# Patient Record
Sex: FEMALE | Race: WHITE | Employment: STUDENT | URBAN - NONMETROPOLITAN AREA
[De-identification: names, ages, dates, MRNs, and addresses within clinical notes are randomized per-mention and may not be internally consistent; named-entity substitution may affect disease eponyms.]

---
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
## 2022-07-05 ENCOUNTER — OPTICAL OFFICE (OUTPATIENT)
Dept: URBAN - NONMETROPOLITAN AREA CLINIC 4 | Facility: CLINIC | Age: 22
Setting detail: OPHTHALMOLOGY
End: 2022-07-05
Payer: COMMERCIAL

## 2022-07-05 DIAGNOSIS — H52.223: ICD-10-CM

## 2022-07-05 PROCEDURE — V2020 VISION SVCS FRAMES PURCHASES: HCPCS | Performed by: OPTOMETRIST

## 2022-07-05 PROCEDURE — V2102 SINGL VISN SPHERE 7.12-20.00: HCPCS | Performed by: OPTOMETRIST

## 2022-07-05 PROCEDURE — V2784 LENS POLYCARB OR EQUAL: HCPCS | Performed by: OPTOMETRIST

## 2022-07-05 PROCEDURE — V2103 SPHEROCYLINDR 4.00D/12-2.00D: HCPCS | Performed by: OPTOMETRIST

## 2022-07-08 ENCOUNTER — OPTICAL OFFICE (OUTPATIENT)
Dept: URBAN - NONMETROPOLITAN AREA CLINIC 4 | Facility: CLINIC | Age: 22
Setting detail: OPHTHALMOLOGY
End: 2022-07-08
Payer: COMMERCIAL

## 2022-07-08 DIAGNOSIS — H52.223: ICD-10-CM

## 2022-07-08 PROCEDURE — V2103 SPHEROCYLINDR 4.00D/12-2.00D: HCPCS | Performed by: OPTOMETRIST

## 2022-07-08 PROCEDURE — V2102 SINGL VISN SPHERE 7.12-20.00: HCPCS | Performed by: OPTOMETRIST

## 2022-07-08 PROCEDURE — V2020 VISION SVCS FRAMES PURCHASES: HCPCS | Performed by: OPTOMETRIST

## 2022-07-08 PROCEDURE — V2784 LENS POLYCARB OR EQUAL: HCPCS | Performed by: OPTOMETRIST

## 2022-12-02 ENCOUNTER — OFFICE VISIT (OUTPATIENT)
Dept: URGENT CARE | Facility: CLINIC | Age: 22
End: 2022-12-02

## 2022-12-02 VITALS
RESPIRATION RATE: 16 BRPM | OXYGEN SATURATION: 97 % | DIASTOLIC BLOOD PRESSURE: 94 MMHG | HEART RATE: 112 BPM | WEIGHT: 270 LBS | BODY MASS INDEX: 39.87 KG/M2 | TEMPERATURE: 102.1 F | SYSTOLIC BLOOD PRESSURE: 132 MMHG

## 2022-12-02 DIAGNOSIS — R68.89 FLU-LIKE SYMPTOMS: Primary | ICD-10-CM

## 2022-12-02 RX ORDER — ACETAMINOPHEN 325 MG/1
975 TABLET ORAL ONCE
Status: COMPLETED | OUTPATIENT
Start: 2022-12-02 | End: 2022-12-02

## 2022-12-02 RX ADMIN — ACETAMINOPHEN 975 MG: 325 TABLET ORAL at 09:35

## 2022-12-02 NOTE — LETTER
[address] [facility]   [facility] [address]  [address] [facility] [address]  Dept: [phone]    December 2, 2022    Patient: Brittany Walters Minor  YOB: 2000    Isela LLILY Minor was seen and evaluated at our [facility]  Please note if Covid and Flu tests are negative, they may return to work when fever free for 24 hours without the use of a fever reducing agent  If Covid or Flu test is positive, they may return to work on 12/6/2022, as this is 5 days from the onset of symptoms  Upon return, they must then adhere to strict masking for an additional 5 days      Sincerely,    LARS Inman

## 2022-12-02 NOTE — PATIENT INSTRUCTIONS
COVID/Flu PCR pending  Results will be available via [employer]  Follow CDC guidelines for isolation/quarantine until results back and then as appropriate  Continue with supportive measures, OTC Tylenol/Ibuprofen, nasal decongestants/cough suppressants   Cool mist humidifiers, throat lozenges, increased fluid intake and rest   Follow up with PCP in 3-5 days  Present to ER if symptoms worsen    Viral Syndrome   AMBULATORY CARE:   Viral syndrome  is a term used for symptoms of an infection caused by a virus  Viruses are spread easily from person to person through the air and on shared items  Signs and symptoms  may start slowly or suddenly and last hours to days  They can be mild to severe and can change over days or hours  You may have any of the following:  Fever and chills    A runny or stuffy nose    Cough, sore throat, or hoarseness    Headache, or pain and pressure around your eyes    Muscle aches and joint pain    Shortness of breath or wheezing    Abdominal pain, cramps, and diarrhea    Nausea, vomiting, or loss of appetite    Call your local emergency number (911 in the [address] [place] [address],[address]) or have someone else call if:   You have a seizure  You cannot be woken  You have chest pain or trouble breathing  Seek care immediately if:   You have a stiff neck, a bad headache, and sensitivity to light  You feel weak, dizzy, or confused  You stop urinating or urinate a lot less than usual     You cough up blood or thick yellow or green mucus  You have severe abdominal pain or your abdomen is larger than usual     Call your doctor if:   Your symptoms do not get better with treatment or get worse after 3 days  You have a rash or ear pain  You have burning when you urinate  You have questions or concerns about your condition or care  Treatment for viral syndrome  may include medicines to manage your symptoms  Antibiotics are not given for a viral infection   You may  need any of the following:  Acetaminophen  decreases pain and fever  It is available without a doctor's order  Ask how much to take and how often to take it  Follow directions  Read the labels of all other medicines you are using to see if they also contain acetaminophen, or ask your doctor or pharmacist  Acetaminophen can cause liver damage if not taken correctly  Do not use more than 4 grams (4,000 milligrams) total of acetaminophen in one day  NSAIDs , such as ibuprofen, help decrease swelling, pain, and fever  NSAIDs can cause stomach bleeding or kidney problems in certain people  If you take blood thinner medicine, always ask your healthcare provider if NSAIDs are safe for you  Always read the medicine label and follow directions  Cold medicine  helps decrease swelling, control a cough, and relieve chest or nasal congestion  Saline nasal spray  helps decrease nasal congestion  Manage your symptoms:   Drink liquids as directed to prevent dehydration  Ask how much liquid to drink each day and which liquids are best for you  Ask if you should drink an oral rehydration solution (ORS)  An ORS has the right amounts of water, salts, and sugar you need to replace body fluids  This may help prevent dehydration caused by vomiting or diarrhea  Do not drink liquids with caffeine  Liquids with caffeine can make dehydration worse  Get plenty of rest to help your body heal   Take naps throughout the day  Ask your healthcare provider when you can return to work and your normal activities  Use a cool mist humidifier to help you breathe easier  Ask your healthcare provider how to use a cool mist humidifier  Eat honey or use cough drops for a sore throat  Cough drops are available without a doctor's order  Follow directions for taking cough drops  Do not smoke or be close to anyone who is smoking  Nicotine and other chemicals in cigarettes and cigars can cause lung damage  Smoking can also delay healing   Ask your healthcare provider for information if you currently smoke and need help to quit  E-cigarettes or smokeless tobacco still contain nicotine  Talk to your healthcare provider before you use these products  Prevent the spread of germs:       Wash your hands often  Wash your hands several times each day  Wash after you use the bathroom, change a child's diaper, and before you prepare or eat food  Use soap and water every time  Rub your soapy hands together, lacing your fingers  Wash the front and back of your hands, and in between your fingers  Use the fingers of one hand to scrub under the fingernails of the other hand  Wash for at least 20 seconds  Rinse with warm, running water for several seconds  Then dry your hands with a clean towel or paper towel  Use hand  that contains alcohol if soap and water are not available  Do not touch your eyes, nose, or mouth without washing your hands first          Cover a sneeze or cough  Use a tissue that covers your mouth and nose  Throw the tissue away in a trash can right away  Use the bend of your arm if a tissue is not available  Wash your hands well with soap and water or use a hand   Stay away from others while you are sick  Avoid crowds as much as possible  Ask about vaccines you may need  Talk to your healthcare provider about your vaccine history  He or she will tell you which vaccines you need, and when to get them  Get the influenza (flu) vaccine as soon as recommended each year  The flu vaccine is available starting in September or October  Flu viruses change, so it is important to get a flu vaccine every year  Get the pneumonia vaccine if recommended  This vaccine is usually recommended every 5 years  Your provider will tell you when to get this vaccine, if needed  Follow up with your doctor as directed:  Write down your questions so you remember to ask them during your visits    © Copyright [employer] 2022 Information is for End User's use only and may not be sold, redistributed or otherwise used for commercial purposes  All illustrations and images included in CareNotes® are the copyrighted property of A D [employer] M [employer]  or Johann Olivares  The above information is an  only  It is not intended as medical advice for individual conditions or treatments  Talk to your doctor, nurse or pharmacist before following any medical regimen to see if it is safe and effective for you

## 2022-12-02 NOTE — PROGRESS NOTES
[facility] [employer]        NAME: Randy Abraham is a 23 y o  female  : 2000    MRN: [id number]  DATE: 2022  TIME: 9:43 AM    Assessment and Plan   Flu-like symptoms [R68 89]  1  Flu-like symptoms  Covid19 and INFLUENZA A/B PCR    acetaminophen (TYLENOL) tablet 975 mg        While patient febrile in clinic, lungs clear on PE and so chest XR not indicated  Patient medicated with Tylenol for fever  COVID/Flu PCR pending  Results will be viewable via [employer]  Follow CDC guidelines for isolation/quarantine until results back and then as appropriate based on final results  Presumed Influenza given known sick contacts and exposures, encouraged continued supportive measures  Increased fluid intake and rest  Follow up with PCP in 3-5 days or proceed to emergency department for worsening symptoms  Patient verbalized understanding of instructions given  Patient Instructions     Patient Instructions     COVID/Flu PCR pending  Results will be available via [employer]  Follow CDC guidelines for isolation/quarantine until results back and then as appropriate  Continue with supportive measures, OTC Tylenol/Ibuprofen, nasal decongestants/cough suppressants   Cool mist humidifiers, throat lozenges, increased fluid intake and rest   Follow up with PCP in 3-5 days  Present to ER if symptoms worsen    Viral Syndrome   AMBULATORY CARE:   Viral syndrome  is a term used for symptoms of an infection caused by a virus  Viruses are spread easily from person to person through the air and on shared items  Signs and symptoms  may start slowly or suddenly and last hours to days  They can be mild to severe and can change over days or hours   You may have any of the following:  · Fever and chills    · A runny or stuffy nose    · Cough, sore throat, or hoarseness    · Headache, or pain and pressure around your eyes    · Muscle aches and joint pain    · Shortness of breath or wheezing    · Abdominal pain, cramps, and diarrhea    · Nausea, vomiting, or loss of appetite    Call your local emergency number (911 in the [facility]) or have someone else call if:   · You have a seizure  · You cannot be woken  · You have chest pain or trouble breathing  Seek care immediately if:   · You have a stiff neck, a bad headache, and sensitivity to light  · You feel weak, dizzy, or confused  · You stop urinating or urinate a lot less than usual     · You cough up blood or thick yellow or green mucus  · You have severe abdominal pain or your abdomen is larger than usual     Call your doctor if:   · Your symptoms do not get better with treatment or get worse after 3 days  · You have a rash or ear pain  · You have burning when you urinate  · You have questions or concerns about your condition or care  Treatment for viral syndrome  may include medicines to manage your symptoms  Antibiotics are not given for a viral infection  You may  need any of the following:  · Acetaminophen  decreases pain and fever  It is available without a doctor's order  Ask how much to take and how often to take it  Follow directions  Read the labels of all other medicines you are using to see if they also contain acetaminophen, or ask your doctor or pharmacist  Acetaminophen can cause liver damage if not taken correctly  Do not use more than 4 grams (4,000 milligrams) total of acetaminophen in one day  · NSAIDs , such as ibuprofen, help decrease swelling, pain, and fever  NSAIDs can cause stomach bleeding or kidney problems in certain people  If you take blood thinner medicine, always ask your healthcare provider if NSAIDs are safe for you  Always read the medicine label and follow directions  · Cold medicine  helps decrease swelling, control a cough, and relieve chest or nasal congestion  · Saline nasal spray  helps decrease nasal congestion  Manage your symptoms:   · Drink liquids as directed to prevent dehydration    Ask how much liquid to drink each day and which liquids are best for you  Ask if you should drink an oral rehydration solution (ORS)  An ORS has the right amounts of water, salts, and sugar you need to replace body fluids  This may help prevent dehydration caused by vomiting or diarrhea  Do not drink liquids with caffeine  Liquids with caffeine can make dehydration worse  · Get plenty of rest to help your body heal   Take naps throughout the day  Ask your healthcare provider when you can return to work and your normal activities  · Use a cool mist humidifier to help you breathe easier  Ask your healthcare provider how to use a cool mist humidifier  · Eat honey or use cough drops for a sore throat  Cough drops are available without a doctor's order  Follow directions for taking cough drops  · Do not smoke or be close to anyone who is smoking  Nicotine and other chemicals in cigarettes and cigars can cause lung damage  Smoking can also delay healing  Ask your healthcare provider for information if you currently smoke and need help to quit  E-cigarettes or smokeless tobacco still contain nicotine  Talk to your healthcare provider before you use these products  Prevent the spread of germs:       1  Wash your hands often  Wash your hands several times each day  Wash after you use the bathroom, change a child's diaper, and before you prepare or eat food  Use soap and water every time  Rub your soapy hands together, lacing your fingers  Wash the front and back of your hands, and in between your fingers  Use the fingers of one hand to scrub under the fingernails of the other hand  Wash for at least 20 seconds  Rinse with warm, running water for several seconds  Then dry your hands with a clean towel or paper towel  Use hand  that contains alcohol if soap and water are not available  Do not touch your eyes, nose, or mouth without washing your hands first          2  Cover a sneeze or cough    Use a tissue that covers your mouth and nose  Throw the tissue away in a trash can right away  Use the bend of your arm if a tissue is not available  Wash your hands well with soap and water or use a hand   3  Stay away from others while you are sick  Avoid crowds as much as possible  4  Ask about vaccines you may need  Talk to your healthcare provider about your vaccine history  He or she will tell you which vaccines you need, and when to get them  ? Get the influenza (flu) vaccine as soon as recommended each year  The flu vaccine is available starting in September or October  Flu viruses change, so it is important to get a flu vaccine every year  ? Get the pneumonia vaccine if recommended  This vaccine is usually recommended every 5 years  Your provider will tell you when to get this vaccine, if needed  Follow up with your doctor as directed:  Write down your questions so you remember to ask them during your visits  © [employer] 2022 Information is for End User's use only and may not be sold, redistributed or otherwise used for commercial purposes  All illustrations and images included in CareNotes® are the copyrighted property of [employer]  or [address] [place], [place] [address]carlos   The above information is an  only  It is not intended as medical advice for individual conditions or treatments  Talk to your doctor, nurse or pharmacist before following any medical regimen to see if it is safe and effective for you  Chief Complaint     Chief Complaint   Patient presents with   • Cough     Cough, congestion, sweats and fatigue  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         History of Present Illness       17-year-old female presents with complaints of cough, sore throat, body aches, chills, and diarrhea x2 days  Patient states positive sick contacts as 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    She has not been taking any OTC medications for her symptoms  She is a current smoker, admits to vaping  She did not receive her influenza vaccination yet this year  Review of Systems   Review of Systems   Constitutional: Positive for chills and fever  HENT: Positive for congestion and sore throat  Negative for ear discharge, ear pain, trouble swallowing and voice change  Eyes: Negative for discharge  Respiratory: Positive for cough  Negative for shortness of breath and wheezing  Cardiovascular: Negative for chest pain  Gastrointestinal: Positive for diarrhea  Negative for abdominal pain, nausea and vomiting  Musculoskeletal: Positive for myalgias  Skin: Negative for rash  Current Medications       Current Outpatient Medications:   •  FLUoxetine (PROzac) 10 mg capsule, Take 10 mg by mouth daily, Disp: , Rfl:   •  albuterol (Proventil HFA) 90 mcg/act inhaler, Inhale 2 puffs every 6 (six) hours as needed for wheezing, Disp: 6 7 g, Rfl: 0  •  raltegravir (ISENTRESS) 400 mg tablet, Take 400 mg by mouth 2 (two) times a day, Disp: , Rfl:   No current facility-administered medications for this visit  Current Allergies     Allergies as of 12/02/2022 - Reviewed 12/02/2022   Allergen Reaction Noted   • Amoxicillin Rash 11/26/2019            The following portions of the patient's history were reviewed and updated as appropriate: allergies, current medications, past family history, past medical history, past social history, past surgical history and problem list      Past Medical History:   Diagnosis Date   • Anxiety    • Depression        Past Surgical History:   Procedure Laterality Date   • NO PAST SURGERIES         Family History   Problem Relation Age of Onset   • No Known Problems Mother    • No Known Problems Father          Medications have been verified          Objective   /94   Pulse (!) 112   Temp (!) 102 1 °F (38 9 °C)   Resp 16   Wt 122 kg (270 lb)   LMP 11/06/2022   SpO2 97%   BMI 39 87 kg/m²   Patient's last menstrual period was 11/06/2022  Physical Exam     Physical Exam  Vitals and nursing note reviewed  Constitutional:       General: She is not in acute distress  Appearance: She is ill-appearing  She is not toxic-appearing  HENT:      Head: Normocephalic  Right Ear: Tympanic membrane, ear canal and external ear normal       Left Ear: Tympanic membrane, ear canal and external ear normal       Nose: Congestion present  Mouth/Throat:      Mouth: Mucous membranes are moist       Pharynx: Posterior oropharyngeal erythema present  Tonsils: No tonsillar exudate  Eyes:      Conjunctiva/sclera: Conjunctivae normal    Cardiovascular:      Rate and Rhythm: Regular rhythm  Tachycardia present  Heart sounds: Normal heart sounds  Pulmonary:      Effort: Pulmonary effort is normal  No respiratory distress  Breath sounds: Normal breath sounds  No stridor  No wheezing, rhonchi or rales  Lymphadenopathy:      Cervical: No cervical adenopathy  Skin:     General: Skin is warm and dry  Neurological:      Mental Status: She is alert and oriented to person, place, and time  Gait: Gait is intact     Psychiatric:         Mood and Affect: Mood normal          Behavior: Behavior normal

## 2022-12-03 LAB
FLUAV RNA RESP QL NAA+PROBE: NEGATIVE
FLUBV RNA RESP QL NAA+PROBE: NEGATIVE
SARS-COV-2 RNA RESP QL NAA+PROBE: NEGATIVE

## 2022-12-04 ENCOUNTER — HOSPITAL ENCOUNTER (EMERGENCY)
Facility: HOSPITAL | Age: 22
Discharge: HOME | End: 2022-12-04
Attending: EMERGENCY MEDICINE

## 2022-12-04 ENCOUNTER — APPOINTMENT (EMERGENCY)
Dept: RADIOLOGY | Facility: HOSPITAL | Age: 22
End: 2022-12-04

## 2022-12-04 VITALS
TEMPERATURE: 97.3 F | HEART RATE: 98 BPM | DIASTOLIC BLOOD PRESSURE: 80 MMHG | RESPIRATION RATE: 20 BRPM | SYSTOLIC BLOOD PRESSURE: 143 MMHG | OXYGEN SATURATION: 98 %

## 2022-12-04 DIAGNOSIS — J32.9 SINUSITIS: ICD-10-CM

## 2022-12-04 DIAGNOSIS — J06.9 URI (UPPER RESPIRATORY INFECTION): Primary | ICD-10-CM

## 2022-12-04 LAB
BACTERIA UR QL AUTO: ABNORMAL /HPF
BILIRUB UR QL STRIP: ABNORMAL
CLARITY UR: CLEAR
COLOR UR: YELLOW
GLUCOSE UR STRIP-MCNC: NEGATIVE MG/DL
HGB UR QL STRIP.AUTO: NEGATIVE
KETONES UR STRIP-MCNC: NEGATIVE MG/DL
LEUKOCYTE ESTERASE UR QL STRIP: ABNORMAL
MUCOUS THREADS UR QL AUTO: ABNORMAL
NITRITE UR QL STRIP: NEGATIVE
NON-SQ EPI CELLS URNS QL MICRO: ABNORMAL /HPF
PH UR STRIP.AUTO: 6 [PH]
PROT UR STRIP-MCNC: ABNORMAL MG/DL
RBC #/AREA URNS AUTO: ABNORMAL /HPF
S PYO DNA THROAT QL NAA+PROBE: NOT DETECTED
SP GR UR STRIP.AUTO: >=1.03 (ref 1–1.03)
UROBILINOGEN UR QL STRIP.AUTO: 0.2 E.U./DL
WBC #/AREA URNS AUTO: ABNORMAL /HPF

## 2022-12-04 RX ORDER — FLUCONAZOLE 150 MG/1
150 TABLET ORAL ONCE
Qty: 1 TABLET | Refills: 1 | Status: SHIPPED | OUTPATIENT
Start: 2022-12-04 | End: 2022-12-04

## 2022-12-04 RX ORDER — DOXYCYCLINE HYCLATE 100 MG/1
100 CAPSULE ORAL ONCE
Status: COMPLETED | OUTPATIENT
Start: 2022-12-04 | End: 2022-12-04

## 2022-12-04 RX ORDER — KETOROLAC TROMETHAMINE 30 MG/ML
15 INJECTION, SOLUTION INTRAMUSCULAR; INTRAVENOUS ONCE
Status: COMPLETED | OUTPATIENT
Start: 2022-12-04 | End: 2022-12-04

## 2022-12-04 RX ORDER — DOXYCYCLINE HYCLATE 100 MG/1
100 CAPSULE ORAL 2 TIMES DAILY
Qty: 10 CAPSULE | Refills: 0 | Status: SHIPPED | OUTPATIENT
Start: 2022-12-04 | End: 2022-12-09

## 2022-12-04 RX ADMIN — DOXYCYCLINE 100 MG: 100 CAPSULE ORAL at 18:04

## 2022-12-04 RX ADMIN — KETOROLAC TROMETHAMINE 15 MG: 30 INJECTION, SOLUTION INTRAMUSCULAR at 16:21

## 2022-12-04 NOTE — ED PROVIDER NOTES
History  Chief Complaint   Patient presents with   • Fever - 9 weeks to 74 years     Patient c/o fever, body aches, chills, bilateral ear pain and cough since Thursday  Patient took nothing for pain or fever since last night  Patient is a 77-year-old female presents emergency room with her mother with chief complaint the patient having fever, body aches, chills, bilateral ear pain, cough, congestion ongoing since last Thursday  Was seen at the urgent care 2 days ago and had COVID influenza testing  Both of which were negative  Patient has persisted with her symptomatology and presents emergency room for further evaluation  Patient has an additional symptomatology of dysuria but denies any frequency or urgency  Has no abdominal pain  Had 1 episode of diarrhea that is now relieved  Has had no nausea vomiting has tolerated p o  Intake without any difficulty  She is not currently on any medications  She denies any chronic medical illnesses  History provided by:  Patient and parent  Flu Symptoms  Presenting symptoms: cough, diarrhea, fatigue, fever, headache, myalgias and sore throat    Presenting symptoms: no nausea, no rhinorrhea, no shortness of breath and no vomiting    Diarrhea:     Progression:  Resolved  Fever:     Max temp PTA:  100    Temp source:  Oral  Headaches:     Severity:  Moderate  Myalgias:     Location:  Generalized  Associated symptoms: decreased appetite, decreased physical activity and nasal congestion    Associated symptoms: no ear pain    Risk factors: no diabetes problem, no heart disease, no immunocompromised state, no kidney disease, no liver disease and not pregnant        Prior to Admission Medications   Prescriptions Last Dose Informant Patient Reported? Taking?    FLUoxetine (PROzac) 10 mg capsule   Yes No   Sig: Take 10 mg by mouth daily   albuterol (Proventil HFA) 90 mcg/act inhaler   No No   Sig: Inhale 2 puffs every 6 (six) hours as needed for wheezing   raltegravir (ISENTRESS) 400 mg tablet   Yes No   Sig: Take 400 mg by mouth 2 (two) times a day      Facility-Administered Medications: None       Past Medical History:   Diagnosis Date   • Anxiety    • Depression        Past Surgical History:   Procedure Laterality Date   • NO PAST SURGERIES         Family History   Problem Relation Age of Onset   • No Known Problems Mother    • No Known Problems Father      I have reviewed and agree with the history as documented  E-Cigarette/Vaping   • E-Cigarette Use Current Every Day User    • Comments "I hit it basically all day everyday"      E-Cigarette/Vaping Substances     Social History     Tobacco Use   • Smoking status: Every Day     Types: E-Cigarettes     Passive exposure: Never   • Smokeless tobacco: Never   Vaping Use   • Vaping Use: Every day   Substance Use Topics   • Alcohol use: Not Currently   • Drug use: Never       Review of Systems   Constitutional: Positive for decreased appetite, fatigue and fever  Negative for activity change  HENT: Positive for congestion and sore throat  Negative for ear pain, rhinorrhea and sinus pressure  Eyes: Negative  Respiratory: Positive for cough  Negative for chest tightness, shortness of breath, wheezing and stridor  Cardiovascular: Negative  Negative for chest pain, palpitations and leg swelling  Gastrointestinal: Positive for diarrhea  Negative for abdominal pain, nausea and vomiting  Endocrine: Negative  Genitourinary: Positive for dysuria  Negative for flank pain, frequency, urgency and vaginal bleeding  Musculoskeletal: Positive for myalgias  Negative for arthralgias and back pain  Skin: Negative  Negative for rash  Allergic/Immunologic: Negative  Neurological: Positive for headaches  Negative for dizziness, weakness and light-headedness  Hematological: Negative  Psychiatric/Behavioral: Negative  All other systems reviewed and are negative        Physical Exam  Physical Exam  Vitals and nursing note reviewed  Constitutional:       General: She is awake  She is in acute distress  Appearance: Normal appearance  She is well-developed  She is obese  She is not ill-appearing or toxic-appearing  HENT:      Head: Normocephalic and atraumatic  Right Ear: Tympanic membrane, ear canal and external ear normal       Left Ear: Tympanic membrane, ear canal and external ear normal       Nose: Nose normal  No congestion  Mouth/Throat:      Mouth: Mucous membranes are moist       Pharynx: Posterior oropharyngeal erythema present  No oropharyngeal exudate  Eyes:      General: Lids are normal  No scleral icterus  Extraocular Movements: Extraocular movements intact  Pupils: Pupils are equal, round, and reactive to light  Cardiovascular:      Rate and Rhythm: Normal rate and regular rhythm  Heart sounds: Normal heart sounds  No murmur heard  Pulmonary:      Effort: Pulmonary effort is normal  No respiratory distress  Breath sounds: Normal breath sounds  No wheezing, rhonchi or rales  Abdominal:      General: Abdomen is flat  There is no distension  Palpations: Abdomen is soft  Tenderness: There is no abdominal tenderness  There is no guarding or rebound  Musculoskeletal:         General: No swelling, tenderness or deformity  Normal range of motion  Cervical back: Normal range of motion and neck supple  Skin:     General: Skin is warm and dry  Capillary Refill: Capillary refill takes less than 2 seconds  Coloration: Skin is not jaundiced or pale  Findings: No bruising or rash  Neurological:      General: No focal deficit present  Mental Status: She is alert and oriented to person, place, and time  Mental status is at baseline  Cranial Nerves: No cranial nerve deficit  Motor: No weakness     Psychiatric:         Attention and Perception: Attention normal          Mood and Affect: Mood normal          Speech: Speech normal  Behavior: Behavior normal          Thought Content:  Thought content normal          Judgment: Judgment normal          Vital Signs  ED Triage Vitals   Temperature Pulse Respirations Blood Pressure SpO2   12/04/22 1535 12/04/22 1535 12/04/22 1535 12/04/22 1535 12/04/22 1535   (!) 97 3 °F (36 3 °C) 98 20 143/80 98 %      Temp Source Heart Rate Source Patient Position - Orthostatic VS BP Location FiO2 (%)   12/04/22 1535 12/04/22 1535 12/04/22 1535 12/04/22 1535 --   Temporal Monitor Sitting Left arm       Pain Score       12/04/22 1621       5           Vitals:    12/04/22 1535   BP: 143/80   Pulse: 98   Patient Position - Orthostatic VS: Sitting         Visual Acuity      ED Medications  Medications   doxycycline hyclate (VIBRAMYCIN) capsule 100 mg (has no administration in time range)   ketorolac (TORADOL) injection 15 mg (15 mg Intramuscular Given 12/4/22 1621)       Diagnostic Studies  Results Reviewed     Procedure Component Value Units Date/Time    Urine Microscopic [[id number]]  (Abnormal) Collected: 12/04/22 1626    Lab Status: Final result Specimen: Urine, Clean Catch Updated: 12/04/22 1744     RBC, UA 2-4 /hpf      WBC, UA 2-4 /hpf      Epithelial Cells Moderate /hpf      Bacteria, UA Moderate /hpf      MUCUS THREADS Moderate    Strep A PCR [[id number]]  (Normal) Collected: 12/04/22 1626    Lab Status: Final result Specimen: Throat Updated: 12/04/22 1700     STREP A PCR Not Detected    UA (URINE) with reflex to Scope [[id number]]  (Abnormal) Collected: 12/04/22 1626    Lab Status: Final result Specimen: Urine, Clean Catch Updated: 12/04/22 1645     Color, UA Yellow     Clarity, UA Clear     Specific Gravity, UA >=1 030     pH, UA 6 0     Leukocytes, UA Trace     Nitrite, UA Negative     Protein, UA Trace mg/dl      Glucose, UA Negative mg/dl      Ketones, UA Negative mg/dl      Urobilinogen, UA 0 2 E U /dl      Bilirubin, UA Small     Occult Blood, UA Negative                 XR chest 2 views   ED Interpretation by Kalia Taylor DO (12/04 1623)   Chest x-ray negative                 Procedures  Procedures         ED Course  ED Course as of 12/04/22 1750   Sun Dec 04, 2022   1525 Flu and Covid are negative from 12/2/2022   1649 Leukocytes, UA(!): Trace   1709 STREP A PCR: Not Detected   1744 Urine likely contaminated  Mother concerned about worsening sinuitis  MDM  Number of Diagnoses or Management Options  Sinusitis: new and requires workup  URI (upper respiratory infection): new and requires workup     Amount and/or Complexity of Data Reviewed  Clinical lab tests: ordered and reviewed  Tests in the radiology section of CPT®: ordered and reviewed  Independent visualization of images, tracings, or specimens: yes    Risk of Complications, Morbidity, and/or Mortality  Presenting problems: moderate  Diagnostic procedures: moderate  Management options: moderate    Patient Progress  Patient progress: stable      Disposition  Final diagnoses:   URI (upper respiratory infection)   Sinusitis     Time reflects when diagnosis was documented in both MDM as applicable and the Disposition within this note     Time User Action Codes Description Comment    12/4/2022  5:46 PM Brenda Lane Add [J06 9] URI (upper respiratory infection)     12/4/2022  5:46 PM Brenda Lane Add [J32 9] Sinusitis       ED Disposition     ED Disposition   Discharge    Condition   Stable    Date/Time   Sun Dec 4, 2022  5:46 PM    Comment   Annmarie LILLY Minor discharge to home/self care                 Follow-up Information     Follow up With Specialties Details Why Contact Info    Juli Mendoza MD Pediatrics   [address]  [phone]            Patient's Medications   Discharge Prescriptions    DOXYCYCLINE HYCLATE (VIBRAMYCIN) 100 MG CAPSULE    Take 1 capsule (100 mg total) by mouth 2 (two) times a day for 5 days       Start Date: 12/4/2022 End Date: 12/9/2022 Order Dose: 100 mg       Quantity: 10 capsule    Refills: 0    FLUCONAZOLE (DIFLUCAN) 150 MG TABLET    Take 1 tablet (150 mg total) by mouth once for 1 dose       Start Date: 12/4/2022 End Date: 12/4/2022       Order Dose: 150 mg       Quantity: 1 tablet    Refills: 1       No discharge procedures on file      PDMP Review     None          ED Provider  Electronically Signed by           Kalia Taylor DO  12/04/22 4408

## 2022-12-04 NOTE — Clinical Note
Haily Abraham was seen and treated in our emergency department on 12/4/2022  Diagnosis:     Haily Thomson  may return to work on return date  She may return on this date: 12/06/2022         If you have any questions or concerns, please don't hesitate to call        Marii Novak DO    ______________________________           _______________          _______________  Hospital Representative                              Date                                Time

## 2022-12-04 NOTE — Clinical Note
Jesu Abraham was seen and treated in our emergency department on 12/4/2022  Diagnosis:     Jesu Kojo  may return to work on return date  She may return on this date: 12/06/2022         If you have any questions or concerns, please don't hesitate to call        Rich Zarate DO    ______________________________           _______________          _______________  Hospital Representative                              Date                                Time

## 2023-06-21 ENCOUNTER — OPTICAL OFFICE (OUTPATIENT)
Dept: URBAN - NONMETROPOLITAN AREA CLINIC 4 | Facility: CLINIC | Age: 23
Setting detail: OPHTHALMOLOGY
End: 2023-06-21
Payer: COMMERCIAL

## 2023-06-21 ENCOUNTER — DOCTOR'S OFFICE (OUTPATIENT)
Dept: URBAN - NONMETROPOLITAN AREA CLINIC 1 | Facility: CLINIC | Age: 23
Setting detail: OPHTHALMOLOGY
End: 2023-06-21
Payer: COMMERCIAL

## 2023-06-21 VITALS — HEIGHT: 55 IN

## 2023-06-21 DIAGNOSIS — H52.223: ICD-10-CM

## 2023-06-21 DIAGNOSIS — Z01.00: ICD-10-CM

## 2023-06-21 DIAGNOSIS — H52.13: ICD-10-CM

## 2023-06-21 PROCEDURE — 92015 DETERMINE REFRACTIVE STATE: CPT | Performed by: OPTOMETRIST

## 2023-06-21 PROCEDURE — V2103 SPHEROCYLINDR 4.00D/12-2.00D: HCPCS | Performed by: OPTOMETRIST

## 2023-06-21 PROCEDURE — V2784 LENS POLYCARB OR EQUAL: HCPCS | Performed by: OPTOMETRIST

## 2023-06-21 PROCEDURE — 92014 COMPRE OPH EXAM EST PT 1/>: CPT | Performed by: OPTOMETRIST

## 2023-06-21 PROCEDURE — V2020 VISION SVCS FRAMES PURCHASES: HCPCS | Performed by: OPTOMETRIST

## 2023-06-21 ASSESSMENT — REFRACTION_MANIFEST
OD_VA2: 20/20
OD_VA1: 20/20-2
OD_CYLINDER: -1.25
OD_SPHERE: -3.25
OS_CYLINDER: -1.00
OD_AXIS: 180
OS_VA1: 20/20-2
OS_VA2: 20/20
OS_SPHERE: -3.25
OS_AXIS: 015

## 2023-06-21 ASSESSMENT — REFRACTION_CURRENTRX
OS_SPHERE: -2.75
OS_VPRISM_DIRECTION: SV
OD_CYLINDER: -1.25
OS_AXIS: 015
OS_CYLINDER: -1.00
OD_OVR_VA: 20/
OD_SPHERE: -3.00
OS_OVR_VA: 20/
OD_VPRISM_DIRECTION: SV
OD_AXIS: 015

## 2023-06-21 ASSESSMENT — KERATOMETRY
OS_K1POWER_DIOPTERS: 44.00
OD_AXISANGLE_DEGREES: 007
OD_K1POWER_DIOPTERS: 43.75
OD_K2POWER_DIOPTERS: 46.00
OS_K2POWER_DIOPTERS: 45.75
OS_AXISANGLE_DEGREES: 009

## 2023-06-21 ASSESSMENT — VISUAL ACUITY
OD_BCVA: 20/25
OS_BCVA: 20/25

## 2023-06-21 ASSESSMENT — SPHEQUIV_DERIVED
OD_SPHEQUIV: -3.875
OS_SPHEQUIV: -3.75
OD_SPHEQUIV: -4.375
OS_SPHEQUIV: -4.25

## 2023-06-21 ASSESSMENT — REFRACTION_AUTOREFRACTION
OD_CYLINDER: -1.75
OD_AXIS: 177
OS_CYLINDER: -1.50
OS_SPHERE: -3.50
OS_AXIS: 007
OD_SPHERE: -3.50

## 2023-06-21 ASSESSMENT — CONFRONTATIONAL VISUAL FIELD TEST (CVF)
OS_FINDINGS: FULL
OD_FINDINGS: FULL

## 2023-06-21 ASSESSMENT — AXIALLENGTH_DERIVED
OS_AL: 24.8017
OD_AL: 24.6413
OD_AL: 24.8556
OS_AL: 24.5882

## 2023-06-21 ASSESSMENT — TONOMETRY
OS_IOP_MMHG: 14
OD_IOP_MMHG: 14

## 2023-06-22 ENCOUNTER — OPTICAL OFFICE (OUTPATIENT)
Dept: URBAN - NONMETROPOLITAN AREA CLINIC 4 | Facility: CLINIC | Age: 23
Setting detail: OPHTHALMOLOGY
End: 2023-06-22
Payer: COMMERCIAL

## 2023-06-22 DIAGNOSIS — H52.223: ICD-10-CM

## 2023-06-22 PROCEDURE — V2103 SPHEROCYLINDR 4.00D/12-2.00D: HCPCS | Performed by: OPTOMETRIST

## 2023-06-22 PROCEDURE — V2020 VISION SVCS FRAMES PURCHASES: HCPCS | Performed by: OPTOMETRIST

## 2023-06-22 PROCEDURE — V2784 LENS POLYCARB OR EQUAL: HCPCS | Performed by: OPTOMETRIST

## 2023-07-21 ENCOUNTER — HOSPITAL ENCOUNTER (EMERGENCY)
Facility: HOSPITAL | Age: 23
Discharge: HOME | End: 2023-07-21
Attending: EMERGENCY MEDICINE
Payer: OTHER MISCELLANEOUS

## 2023-07-21 VITALS
BODY MASS INDEX: 49.09 KG/M2 | DIASTOLIC BLOOD PRESSURE: 97 MMHG | WEIGHT: 260 LBS | HEART RATE: 72 BPM | HEIGHT: 61 IN | TEMPERATURE: 97.9 F | RESPIRATION RATE: 19 BRPM | SYSTOLIC BLOOD PRESSURE: 143 MMHG | OXYGEN SATURATION: 97 %

## 2023-07-21 DIAGNOSIS — M54.50 LOW BACK PAIN: Primary | ICD-10-CM

## 2023-07-21 RX ORDER — IBUPROFEN 400 MG/1
400 TABLET ORAL ONCE
Status: COMPLETED | OUTPATIENT
Start: 2023-07-21 | End: 2023-07-21

## 2023-07-21 RX ORDER — CYCLOBENZAPRINE HCL 10 MG
10 TABLET ORAL 3 TIMES DAILY PRN
Qty: 15 TABLET | Refills: 0 | Status: SHIPPED | OUTPATIENT
Start: 2023-07-21

## 2023-07-21 RX ORDER — ACETAMINOPHEN 325 MG/1
975 TABLET ORAL ONCE
Status: COMPLETED | OUTPATIENT
Start: 2023-07-21 | End: 2023-07-21

## 2023-07-21 RX ORDER — CYCLOBENZAPRINE HCL 10 MG
10 TABLET ORAL ONCE
Status: COMPLETED | OUTPATIENT
Start: 2023-07-21 | End: 2023-07-21

## 2023-07-21 RX ADMIN — IBUPROFEN 400 MG: 400 TABLET, FILM COATED ORAL at 00:47

## 2023-07-21 RX ADMIN — CYCLOBENZAPRINE HYDROCHLORIDE 10 MG: 10 TABLET, FILM COATED ORAL at 00:46

## 2023-07-21 RX ADMIN — ACETAMINOPHEN 975 MG: 325 TABLET ORAL at 00:47

## 2023-07-21 NOTE — ED PROVIDER NOTES
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 Occurred yesterday around 1630. Took ibuprofen at approx 210     33-year-old female accompanied by mother describes low back ache that began when assisting a resident at work, resident became heavy putting strain on her lower back incurring discomfort that has persisted. No direct injury. No numbness or weakness. No bowel or bladder changes. No viral prodrome, fever. Back Pain  Location:  Lumbar spine  Quality:  Aching  Radiates to:  Does not radiate  Pain severity:  Severe  Onset quality:  Sudden  Timing:  Constant  Progression:  Unchanged  Chronicity:  New  Context: physical stress    Relieved by:  Nothing  Worsened by: Movement  Ineffective treatments:  NSAIDs  Associated symptoms: no abdominal pain, no bladder incontinence, no bowel incontinence, no fever, no leg pain, no numbness, no paresthesias, no perianal numbness, no tingling and no weakness    Risk factors: obesity    Risk factors: no hx of cancer and no steroid use        Prior to Admission Medications   Prescriptions Last Dose Informant Patient Reported? Taking? FLUoxetine (PROzac) 10 mg capsule 7/20/2023  Yes Yes   Sig: Take 10 mg by mouth daily      Facility-Administered Medications: None       Past Medical History:   Diagnosis Date   • Anxiety    • Depression        Past Surgical History:   Procedure Laterality Date   • NO PAST SURGERIES         Family History   Problem Relation Age of Onset   • No Known Problems Mother    • No Known Problems Father      I have reviewed and agree with the history as documented.     E-Cigarette/Vaping   • E-Cigarette Use Current Every Day User    • Comments "I hit it basically all day everyday"      E-Cigarette/Vaping Substances     Social History     Tobacco Use   • Smoking status: Every Day     Types: E-Cigarettes     Passive exposure: Never   • Smokeless tobacco: Never   Vaping Use   • Vaping Use: Every day   Substance Use Topics   • Alcohol use: Not Currently   • Drug use: Never       Review of Systems   Constitutional: Negative for fever. Gastrointestinal: Negative for abdominal pain and bowel incontinence. Genitourinary: Negative for bladder incontinence. Musculoskeletal: Positive for back pain. Neurological: Negative for tingling, weakness, numbness and paresthesias. All other systems reviewed and are negative. Physical Exam  Physical Exam  Vitals and nursing note reviewed. Constitutional:       Appearance: She is obese. Comments: Pleasant, comfortable-appearing   HENT:      Head: Normocephalic and atraumatic. Mouth/Throat:      Mouth: Mucous membranes are moist.      Pharynx: Oropharynx is clear. Eyes:      Conjunctiva/sclera: Conjunctivae normal.      Pupils: Pupils are equal, round, and reactive to light. Cardiovascular:      Rate and Rhythm: Normal rate and regular rhythm. Heart sounds: Normal heart sounds. Pulmonary:      Effort: Pulmonary effort is normal.      Breath sounds: Normal breath sounds. Abdominal:      General: Bowel sounds are normal. There is no distension. Palpations: Abdomen is soft. Tenderness: There is no abdominal tenderness. Musculoskeletal:         General: No deformity. Cervical back: Neck supple. Right lower leg: No edema. Left lower leg: No edema. Comments: Generalized lumbosacral bilateral sacroiliac tenderness, no deformity or overlying skin changes   Skin:     General: Skin is warm and dry. Neurological:      General: No focal deficit present. Mental Status: She is alert and oriented to person, place, and time. Cranial Nerves: No cranial nerve deficit. Sensory: No sensory deficit. Motor: No weakness.       Coordination: Coordination normal.      Gait: Gait normal.      Deep Tendon Reflexes: Reflexes normal.   Psychiatric:         Behavior: Behavior normal. Thought Content: Thought content normal.         Judgment: Judgment normal.         Vital Signs  ED Triage Vitals [07/21/23 0032]   Temperature Pulse Respirations Blood Pressure SpO2   97.9 °F (36.6 °C) 72 19 143/97 97 %      Temp Source Heart Rate Source Patient Position - Orthostatic VS BP Location FiO2 (%)   Skin Right;Radial Sitting Right arm --      Pain Score       6           Vitals:    07/21/23 0032   BP: 143/97   Pulse: 72   Patient Position - Orthostatic VS: Sitting         Visual Acuity      ED Medications  Medications   acetaminophen (TYLENOL) tablet 975 mg (975 mg Oral Given 7/21/23 0047)   ibuprofen (MOTRIN) tablet 400 mg (400 mg Oral Given 7/21/23 0047)   cyclobenzaprine (FLEXERIL) tablet 10 mg (10 mg Oral Given 7/21/23 0046)       Diagnostic Studies  Results Reviewed     None                 No orders to display              Procedures  Procedures         ED Course                               SBIRT 20yo+    Flowsheet Row Most Recent Value   Initial Alcohol Screen: US AUDIT-C     1. How often do you have a drink containing alcohol? 0 Filed at: 07/21/2023 0041   2. How many drinks containing alcohol do you have on a typical day you are drinking? 0 Filed at: 07/21/2023 0041   3b. FEMALE Any Age, or MALE 65+: How often do you have 4 or more drinks on one occassion? 0 Filed at: 07/21/2023 0041   Audit-C Score 0 Filed at: 07/21/2023 0041   LUZMARIA: How many times in the past year have you. .. Used an illegal drug or used a prescription medication for non-medical reasons? Never Filed at: 07/21/2023 0041                    Medical Decision Making  Low back pain: acute illness or injury  Amount and/or Complexity of Data Reviewed  Independent Historian: parent      Risk  OTC drugs. Prescription drug management.           Disposition  Final diagnoses:   Low back pain     Time reflects when diagnosis was documented in both MDM as applicable and the Disposition within this note     Time User Action Codes Description Comment    7/21/2023 12:44 AM Elizabet Guo Add [M54.50] Low back pain       ED Disposition     ED Disposition   Discharge    Condition   Stable    Date/Time   Fri Jul 21, 2023 12:44 AM    Comment   Annmarie LILLY Minor discharge to home/self care. Follow-up Information    None         Discharge Medication List as of 7/21/2023 12:46 AM      START taking these medications    Details   cyclobenzaprine (FLEXERIL) 10 mg tablet Take 1 tablet (10 mg total) by mouth 3 (three) times a day as needed for muscle spasms, Starting Fri 7/21/2023, Normal         CONTINUE these medications which have NOT CHANGED    Details   FLUoxetine (PROzac) 10 mg capsule Take 10 mg by mouth daily, Starting Wed 5/26/2021, Historical Med             No discharge procedures on file.     PDMP Review     None          ED Provider  Electronically Signed by           Elizabet Guo DO  07/21/23 1257

## 2023-07-21 NOTE — Clinical Note
Marcia Devi Minor was seen and treated in our emergency department on 7/21/2023. Diagnosis:     Marcia Devi  may return to work on return date. She may return on this date: 07/24/2023         If you have any questions or concerns, please don't hesitate to call.       Martina Browning, DO    ______________________________           _______________          _______________  Hospital Representative                              Date                                Time

## 2023-07-21 NOTE — DISCHARGE INSTRUCTIONS
Contact employee health today and arrange evaluation within 24-48 hours  Return immediately if worse or any new symptoms  Tylenol 1000 mg every 6 hours as needed  and/or  Advil 400 mg every 6 hours as needed  May take both together

## 2024-09-19 ENCOUNTER — OFFICE VISIT (OUTPATIENT)
Dept: URGENT CARE | Facility: CLINIC | Age: 24
End: 2024-09-19
Payer: COMMERCIAL

## 2024-09-19 VITALS
TEMPERATURE: 98.5 F | HEART RATE: 105 BPM | OXYGEN SATURATION: 97 % | BODY MASS INDEX: 50.98 KG/M2 | SYSTOLIC BLOOD PRESSURE: 140 MMHG | WEIGHT: 270 LBS | DIASTOLIC BLOOD PRESSURE: 98 MMHG | HEIGHT: 61 IN | RESPIRATION RATE: 16 BRPM

## 2024-09-19 DIAGNOSIS — N30.01 ACUTE CYSTITIS WITH HEMATURIA: Primary | ICD-10-CM

## 2024-09-19 LAB
SL AMB  POCT GLUCOSE, UA: NEGATIVE
SL AMB LEUKOCYTE ESTERASE,UA: ABNORMAL
SL AMB POCT BILIRUBIN,UA: NEGATIVE
SL AMB POCT BLOOD,UA: ABNORMAL
SL AMB POCT CLARITY,UA: ABNORMAL
SL AMB POCT COLOR,UA: YELLOW
SL AMB POCT KETONES,UA: NEGATIVE
SL AMB POCT NITRITE,UA: POSITIVE
SL AMB POCT PH,UA: 7
SL AMB POCT SPECIFIC GRAVITY,UA: 1.01
SL AMB POCT URINE HCG: NEGATIVE
SL AMB POCT URINE PROTEIN: ABNORMAL
SL AMB POCT UROBILINOGEN: NEGATIVE

## 2024-09-19 PROCEDURE — 87077 CULTURE AEROBIC IDENTIFY: CPT

## 2024-09-19 PROCEDURE — 81002 URINALYSIS NONAUTO W/O SCOPE: CPT

## 2024-09-19 PROCEDURE — 87086 URINE CULTURE/COLONY COUNT: CPT

## 2024-09-19 PROCEDURE — S9088 SERVICES PROVIDED IN URGENT: HCPCS

## 2024-09-19 PROCEDURE — 99213 OFFICE O/P EST LOW 20 MIN: CPT

## 2024-09-19 PROCEDURE — 87186 SC STD MICRODIL/AGAR DIL: CPT

## 2024-09-19 PROCEDURE — 81025 URINE PREGNANCY TEST: CPT

## 2024-09-19 RX ORDER — NITROFURANTOIN 25; 75 MG/1; MG/1
100 CAPSULE ORAL 2 TIMES DAILY
Qty: 10 CAPSULE | Refills: 0 | Status: SHIPPED | OUTPATIENT
Start: 2024-09-19 | End: 2024-09-24

## 2024-09-19 NOTE — PROGRESS NOTES
"  [facility]        NAME: Annmarie LILLY Minor is a 24 y.o. female  : 2000    MRN: [id number]  DATE: 2024  TIME: 11:38 AM    Assessment and Plan   Acute cystitis with hematuria [N30.01]  1. Acute cystitis with hematuria  POCT urine dip    POCT urine HCG    Urine culture    Urine culture    nitrofurantoin (MACROBID) 100 mg capsule        Urine HCG negative. Urine dip showing large leucocytes, positive nitrites, and large blood. Urine culture pending. Will treat with Nitrofurantoin and encouraged continued supportive measures.  Follow up with PCP in 3-5 days or proceed to emergency department for worsening symptoms.  Red flag symptoms reviewed. Patient verbalized understanding of instructions given.        Patient Instructions     Patient Instructions   Urine HCG negative  Urine culture pending  Take antibiotic as prescribed  Increase fluid intake   Follow up with PCP in 3-5 days.  Proceed to  ER if symptoms worsen.    If tests have been performed at [facility], our office will contact you with results if changes need to be made to the care plan discussed with you at the visit.  You can review your full results on [facility] MyChart.      Patient Education     Urinary tract infection - Discharge instructions   The Basics   Written by the doctors and editors at [facility]   What are discharge instructions? -- Discharge instructions are information about how to take care of yourself after getting medical care for a health problem.  What is a urinary tract infection? -- A urinary tract infection (\"UTI\") is an infection that affects either the bladder or the kidneys (figure 1). A kidney infection is more serious, and can lead to other serious problems if it is not treated properly.  You need to take antibiotics to treat a UTI. It is important to take all of your antibiotics, even if you start to feel better.  How do I care for myself at home? -- Ask the doctor or nurse what you should do when you go " home. Make sure that you understand exactly what you need to do to care for yourself. Ask questions if there is anything you do not understand.  You should also:   Take all of your medicines as instructed.   Take phenazopyridine (sample brand name: AZO Urinary Pain Relief) for the first day or so, if you choose. This is an over-the-counter medicine. It will help numb your bladder and decrease the urge to urinate. This medicine causes your urine and tears to look orange.   Take acetaminophen (sample brand name: Tylenol) if needed for pain.   Drink extra fluids. This can help prevent more bladder infections. If you have sex, these things might also help:   Urinate right afterward.   If you use birth control, use a form that does not contain spermicide.  When should I call the doctor? -- Call for advice if:   You have pain in your back, shoulder, or belly.   You have a fever, shaking chills, or sweats even though you are taking antibiotics.   You notice more blood in your urine.   Your symptoms get worse or do not get better within 24 hours of starting antibiotics.   Your symptoms come back after finishing treatment.   You have any new or worrying symptoms.  All topics are updated as new evidence becomes available and our peer review process is complete.  This topic retrieved from [employer] on: Feb 26, 2024.  Topic 015801 Version 1.0  Release: 32.2.4 - C32.56  © 2024 UpToDate, Inc. and/or its affiliates. All rights reserved.  figure 1: Anatomy of the urinary tract     Urine is made by the kidneys. It passes from the kidneys into the bladder through 2 tubes called the ureters. Then, it leaves the bladder through another tube called the urethra.  Graphic 34473 Version 8.0  Consumer Information Use and Disclaimer   Disclaimer: This generalized information is a limited summary of diagnosis, treatment, and/or medication information. It is not meant to be comprehensive and should be used as a tool to help the user understand  and/or assess potential diagnostic and treatment options. It does NOT include all information about conditions, treatments, medications, side effects, or risks that may apply to a specific patient. It is not intended to be medical advice or a substitute for the medical advice, diagnosis, or treatment of a health care provider based on the health care provider's examination and assessment of a patient's specific and unique circumstances. Patients must speak with a health care provider for complete information about their health, medical questions, and treatment options, including any risks or benefits regarding use of medications. This information does not endorse any treatments or medications as safe, effective, or approved for treating a specific patient. UpToDate, Inc. and its affiliates disclaim any warranty or liability relating to this information or the use thereof.The use of this information is governed by the Terms of Use, available at https://www.[employer]/en/know/clinical-effectiveness-terms. 2024© UpToDate, Inc. and its affiliates and/or licensors. All rights reserved.  Copyright   © 2024 UpToDate, Inc. and/or its affiliates. All rights reserved.        Chief Complaint     Chief Complaint   Patient presents with    Possible UTI     Urine frequency and burning X 1 day. Also c/o bilateral flank pain         History of Present Illness       24-year-old female with no significant past medical history presents with complaints of dysuria as well as urinary frequency x 1 day.  Also reports some lower back pain but denies fever, chills, vomiting, or diarrhea.  No hematuria.  No vaginal complaints.  Irregular menstruation.  Sexually active but denies concern for sexually transmitted infections.  Prior history of 1 UTI.        Review of Systems   Review of Systems   Constitutional:  Negative for chills and fever.   Respiratory:  Negative for cough, shortness of breath and wheezing.    Cardiovascular:   "Negative for chest pain.   Gastrointestinal:  Negative for abdominal pain, diarrhea, nausea and vomiting.   Genitourinary:  Positive for difficulty urinating, dysuria and frequency. Negative for flank pain, vaginal bleeding and vaginal discharge.   Musculoskeletal:  Positive for back pain.   Skin:  Negative for rash.         Current Medications       Current Outpatient Medications:     FLUoxetine (PROzac) 10 mg capsule, Take 10 mg by mouth daily, Disp: , Rfl:     nitrofurantoin (MACROBID) 100 mg capsule, Take 1 capsule (100 mg total) by mouth 2 (two) times a day for 5 days, Disp: 10 capsule, Rfl: 0    cyclobenzaprine (FLEXERIL) 10 mg tablet, Take 1 tablet (10 mg total) by mouth 3 (three) times a day as needed for muscle spasms (Patient not taking: Reported on 9/19/2024), Disp: 15 tablet, Rfl: 0    Current Allergies     Allergies as of 09/19/2024 - Reviewed 09/19/2024   Allergen Reaction Noted    Amoxicillin Rash 11/26/2019            The following portions of the patient's history were reviewed and updated as appropriate: allergies, current medications, past family history, past medical history, past social history, past surgical history and problem list.     Past Medical History:   Diagnosis Date    Anxiety     Depression        Past Surgical History:   Procedure Laterality Date    NO PAST SURGERIES         Family History   Problem Relation Age of Onset    No Known Problems Mother     No Known Problems Father          Medications have been verified.        Objective   /98   Pulse 105   Temp 98.5 °F (36.9 °C)   Resp 16   Ht 5' 1\" (1.549 m)   Wt 122 kg (270 lb)   LMP 07/05/2024 (Approximate)   SpO2 97%   BMI 51.02 kg/m²   Patient's last menstrual period was 07/05/2024 (approximate).       Physical Exam     Physical Exam  Vitals and nursing note reviewed.   Constitutional:       General: She is not in acute distress.     Appearance: She is not toxic-appearing.   HENT:      Head: Normocephalic.   Eyes:    "   Conjunctiva/sclera: Conjunctivae normal.   Cardiovascular:      Rate and Rhythm: Normal rate and regular rhythm.      Heart sounds: Normal heart sounds.   Pulmonary:      Effort: Pulmonary effort is normal. No respiratory distress.      Breath sounds: Normal breath sounds. No stridor. No wheezing, rhonchi or rales.   Abdominal:      General: Bowel sounds are normal.      Palpations: Abdomen is soft.      Tenderness: There is abdominal tenderness in the right lower quadrant, suprapubic area and left lower quadrant. There is no right CVA tenderness or left CVA tenderness.   Skin:     General: Skin is warm and dry.   Neurological:      Mental Status: She is alert and oriented to person, place, and time.      Gait: Gait is intact.   Psychiatric:         Mood and Affect: Mood normal.         Behavior: Behavior normal.

## 2024-09-19 NOTE — PATIENT INSTRUCTIONS
"Urine HCG negative  Urine culture pending  Take antibiotic as prescribed  Increase fluid intake   Follow up with PCP in 3-5 days.  Proceed to  ER if symptoms worsen.    If tests have been performed at [facility], our office will contact you with results if changes need to be made to the care plan discussed with you at the visit.  You can review your full results on [facility] MyChart.      Patient Education     Urinary tract infection - Discharge instructions   The Basics   Written by the doctors and editors at [facility]   What are discharge instructions? -- Discharge instructions are information about how to take care of yourself after getting medical care for a health problem.  What is a urinary tract infection? -- A urinary tract infection (\"UTI\") is an infection that affects either the bladder or the kidneys (figure 1). A kidney infection is more serious, and can lead to other serious problems if it is not treated properly.  You need to take antibiotics to treat a UTI. It is important to take all of your antibiotics, even if you start to feel better.  How do I care for myself at home? -- Ask the doctor or nurse what you should do when you go home. Make sure that you understand exactly what you need to do to care for yourself. Ask questions if there is anything you do not understand.  You should also:   Take all of your medicines as instructed.   Take phenazopyridine (sample brand name: AZO Urinary Pain Relief) for the first day or so, if you choose. This is an over-the-counter medicine. It will help numb your bladder and decrease the urge to urinate. This medicine causes your urine and tears to look orange.   Take acetaminophen (sample brand name: Tylenol) if needed for pain.   Drink extra fluids. This can help prevent more bladder infections. If you have sex, these things might also help:   Urinate right afterward.   If you use birth control, use a form that does not contain spermicide.  When should I call the " doctor? -- Call for advice if:   You have pain in your back, shoulder, or belly.   You have a fever, shaking chills, or sweats even though you are taking antibiotics.   You notice more blood in your urine.   Your symptoms get worse or do not get better within 24 hours of starting antibiotics.   Your symptoms come back after finishing treatment.   You have any new or worrying symptoms.  All topics are updated as new evidence becomes available and our peer review process is complete.  This topic retrieved from [employer] on: Feb 26, 2024.  Topic 957857 Version 1.0  Release: 32.2.4 - C32.56  © 2024 UpToDate, Inc. and/or its affiliates. All rights reserved.  figure 1: Anatomy of the urinary tract     Urine is made by the kidneys. It passes from the kidneys into the bladder through 2 tubes called the ureters. Then, it leaves the bladder through another tube called the urethra.  Graphic 07496 Version 8.0  Consumer Information Use and Disclaimer   Disclaimer: This generalized information is a limited summary of diagnosis, treatment, and/or medication information. It is not meant to be comprehensive and should be used as a tool to help the user understand and/or assess potential diagnostic and treatment options. It does NOT include all information about conditions, treatments, medications, side effects, or risks that may apply to a specific patient. It is not intended to be medical advice or a substitute for the medical advice, diagnosis, or treatment of a health care provider based on the health care provider's examination and assessment of a patient's specific and unique circumstances. Patients must speak with a health care provider for complete information about their health, medical questions, and treatment options, including any risks or benefits regarding use of medications. This information does not endorse any treatments or medications as safe, effective, or approved for treating a specific patient. UpToDate, Inc. and  its affiliates disclaim any warranty or liability relating to this information or the use thereof.The use of this information is governed by the Terms of Use, available at https://www.wolterskluwer.com/en/know/clinical-effectiveness-terms. 2024© [employer] and its affiliates and/or licensors. All rights reserved.  Copyright   © 2024 [employer] and/or its affiliates. All rights reserved.

## 2024-09-21 LAB — BACTERIA UR CULT: ABNORMAL

## 2024-12-01 ENCOUNTER — OFFICE VISIT (OUTPATIENT)
Dept: URGENT CARE | Facility: CLINIC | Age: 24
End: 2024-12-01
Payer: COMMERCIAL

## 2024-12-01 VITALS
HEIGHT: 61 IN | BODY MASS INDEX: 50.98 KG/M2 | SYSTOLIC BLOOD PRESSURE: 118 MMHG | OXYGEN SATURATION: 97 % | DIASTOLIC BLOOD PRESSURE: 82 MMHG | TEMPERATURE: 96.8 F | RESPIRATION RATE: 18 BRPM | WEIGHT: 270 LBS | HEART RATE: 89 BPM

## 2024-12-01 DIAGNOSIS — R35.0 URINARY FREQUENCY: Primary | ICD-10-CM

## 2024-12-01 LAB
SL AMB  POCT GLUCOSE, UA: NEGATIVE
SL AMB LEUKOCYTE ESTERASE,UA: NEGATIVE
SL AMB POCT BILIRUBIN,UA: NEGATIVE
SL AMB POCT BLOOD,UA: NEGATIVE
SL AMB POCT CLARITY,UA: CLEAR
SL AMB POCT COLOR,UA: YELLOW
SL AMB POCT KETONES,UA: NEGATIVE
SL AMB POCT NITRITE,UA: NEGATIVE
SL AMB POCT PH,UA: 5
SL AMB POCT SPECIFIC GRAVITY,UA: 1.01
SL AMB POCT URINE HCG: NEGATIVE
SL AMB POCT URINE PROTEIN: NEGATIVE
SL AMB POCT UROBILINOGEN: NEGATIVE

## 2024-12-01 PROCEDURE — G0382 LEV 3 HOSP TYPE B ED VISIT: HCPCS

## 2024-12-01 PROCEDURE — 81025 URINE PREGNANCY TEST: CPT

## 2024-12-01 PROCEDURE — 81002 URINALYSIS NONAUTO W/O SCOPE: CPT

## 2024-12-01 PROCEDURE — S9083 URGENT CARE CENTER GLOBAL: HCPCS

## 2024-12-01 PROCEDURE — 87086 URINE CULTURE/COLONY COUNT: CPT

## 2024-12-01 RX ORDER — NITROFURANTOIN 25; 75 MG/1; MG/1
100 CAPSULE ORAL 2 TIMES DAILY
Qty: 14 CAPSULE | Refills: 0 | Status: SHIPPED | OUTPATIENT
Start: 2024-12-01 | End: 2024-12-09

## 2024-12-01 NOTE — PROGRESS NOTES
[facility]        NAME: Annmarie LILLY Minor is a 24 y.o. female  : 2000    MRN: [id number]  DATE: 2024  TIME: 10:13 AM    Assessment and Plan   Urinary frequency [R35.0]  1. Urinary frequency  POCT urine dip    Urine culture    POCT urine HCG        Discussed problem with patient.  Urine dip revealed no acute abnormalities but sending for urine culture.  Urine Prag negative.  Unclear etiology of symptoms.  Patient states her symptoms are in line with her typical UTIs so we will prescribe Macrobid for UTI symptoms but discussed strict red flag ER precautions for worsening signs or symptoms.  Should follow with PCP for this issue    Patient Instructions       Follow up with PCP in 3-5 days.  Proceed to  ER if symptoms worsen.    If tests are performed, our office will contact you with results only if changes need to made to the care plan discussed with you at the visit. [facility]    Chief Complaint     Chief Complaint   Patient presents with   • Possible UTI     Pt c/o stomach ache, back ache, urinary frequency , and burning x2 days. No fevers or chills.         History of Present Illness       Pt c/o stomach ache, back ache, urinary frequency , and burning x2 days. No fevers or chills.  Past medical history of UTIs growing E. Coli.  Eating and drinking normally and denies any fevers, chills, body aches, nausea.  Abdominal pain is mild and crampy but also reports pelvic pain.  Denies any concern for gonorrhea or chlamydia.  Having normal bowel movements and last one was yesterday without any straining.  No recent cold or flu symptoms      Review of Systems   Review of Systems   Constitutional:  Negative for appetite change, chills, fatigue and fever.   Respiratory:  Negative for cough, shortness of breath, wheezing and stridor.    Cardiovascular:  Negative for chest pain and palpitations.   Gastrointestinal:  Positive for abdominal pain. Negative  "for constipation, diarrhea, nausea and vomiting.   Genitourinary:  Positive for dysuria, frequency and pelvic pain. Negative for flank pain, hematuria, urgency and vaginal discharge.   Musculoskeletal:  Negative for myalgias.         Current Medications       Current Outpatient Medications:   •  FLUoxetine (PROzac) 10 mg capsule, Take 10 mg by mouth daily, Disp: , Rfl:   •  cyclobenzaprine (FLEXERIL) 10 mg tablet, Take 1 tablet (10 mg total) by mouth 3 (three) times a day as needed for muscle spasms (Patient not taking: Reported on 12/1/2024), Disp: 15 tablet, Rfl: 0    Current Allergies     Allergies as of 12/01/2024 - Reviewed 12/01/2024   Allergen Reaction Noted   • Amoxicillin Rash 11/26/2019            The following portions of the patient's history were reviewed and updated as appropriate: allergies, current medications, past family history, past medical history, past social history, past surgical history and problem list.     Past Medical History:   Diagnosis Date   • Anxiety    • Depression        Past Surgical History:   Procedure Laterality Date   • NO PAST SURGERIES         Family History   Problem Relation Age of Onset   • No Known Problems Mother    • No Known Problems Father          Medications have been verified.        Objective   /82   Pulse 89   Temp (!) 96.8 °F (36 °C)   Resp 18   Ht 5' 1\" (1.549 m)   Wt 122 kg (270 lb)   LMP 11/25/2024 (Exact Date)   SpO2 97%   BMI 51.02 kg/m²        Physical Exam     Physical Exam  Vitals and nursing note reviewed.   Constitutional:       General: She is not in acute distress.     Appearance: Normal appearance. She is normal weight. She is not ill-appearing, toxic-appearing or diaphoretic.   Cardiovascular:      Rate and Rhythm: Normal rate and regular rhythm.      Pulses: Normal pulses.      Heart sounds: Normal heart sounds. No murmur heard.     No friction rub. No gallop.   Pulmonary:      Effort: Pulmonary effort is normal. No respiratory " distress.      Breath sounds: Normal breath sounds. No stridor. No wheezing, rhonchi or rales.   Chest:      Chest wall: No tenderness.   Abdominal:      General: Abdomen is flat. Bowel sounds are normal. There is no distension.      Palpations: Abdomen is soft. There is no mass.      Tenderness: There is abdominal tenderness (Bilateral left and right lower quadrant tenderness). There is no right CVA tenderness, left CVA tenderness, guarding or rebound. Negative signs include Rovsing's sign, McBurney's sign, psoas sign and obturator sign.      Hernia: No hernia is present.   Neurological:      Mental Status: She is alert.

## 2024-12-02 LAB — BACTERIA UR CULT: NORMAL

## 2024-12-09 ENCOUNTER — OFFICE VISIT (OUTPATIENT)
Dept: URGENT CARE | Facility: CLINIC | Age: 24
End: 2024-12-09
Payer: COMMERCIAL

## 2024-12-09 VITALS
TEMPERATURE: 98 F | DIASTOLIC BLOOD PRESSURE: 76 MMHG | WEIGHT: 270 LBS | HEART RATE: 100 BPM | SYSTOLIC BLOOD PRESSURE: 118 MMHG | BODY MASS INDEX: 50.98 KG/M2 | HEIGHT: 61 IN | OXYGEN SATURATION: 98 % | RESPIRATION RATE: 16 BRPM

## 2024-12-09 DIAGNOSIS — J06.9 ACUTE URI: Primary | ICD-10-CM

## 2024-12-09 LAB
SARS-COV-2 AG UPPER RESP QL IA: NEGATIVE
VALID CONTROL: NORMAL

## 2024-12-09 PROCEDURE — S9083 URGENT CARE CENTER GLOBAL: HCPCS

## 2024-12-09 PROCEDURE — 87811 SARS-COV-2 COVID19 W/OPTIC: CPT

## 2024-12-09 PROCEDURE — G0382 LEV 3 HOSP TYPE B ED VISIT: HCPCS

## 2024-12-09 NOTE — PROGRESS NOTES
[facility]        NAME: Annmarie LILLY Minor is a 24 y.o. female  : 2000    MRN: [id number]  DATE: 2024  TIME: 12:19 PM    Assessment and Plan   Acute URI [J06.9]  1. Acute URI  Poct Covid 19 Rapid Antigen Test            Patient Instructions       Follow up with PCP in 3-5 days.  Proceed to  ER if symptoms worsen.    If tests are performed, our office will contact you with results only if changes need to made to the care plan discussed with you at the visit. [facility]    Chief Complaint     Chief Complaint   Patient presents with    Cold Like Symptoms     Cough, congestion , bilateral ear ache, sore throat and h/a x 4 days         History of Present Illness       Cough, congestion , bilateral ear ache, sore throat and h/a x 4 days. Using dayquil, nyquil.  Boyfriend and mom are sick with similar symptoms at this time.  Denies any shortness of breath or pleuritic chest pain or abdominal complaints        Review of Systems   Review of Systems   Constitutional:  Positive for fatigue. Negative for appetite change, chills and fever.   HENT:  Positive for congestion, ear pain, postnasal drip, rhinorrhea and sore throat. Negative for sinus pressure and sinus pain.    Respiratory:  Positive for cough. Negative for shortness of breath, wheezing and stridor.    Cardiovascular:  Negative for chest pain and palpitations.   Gastrointestinal:  Negative for abdominal pain, constipation, diarrhea, nausea and vomiting.   Musculoskeletal:  Positive for myalgias.   Neurological:  Positive for headaches. Negative for dizziness, syncope and light-headedness.         Current Medications       Current Outpatient Medications:     FLUoxetine (PROzac) 10 mg capsule, Take 10 mg by mouth daily, Disp: , Rfl:     cyclobenzaprine (FLEXERIL) 10 mg tablet, Take 1 tablet (10 mg total) by mouth 3 (three) times a day as needed for muscle spasms (Patient not taking: Reported on  "9/19/2024), Disp: 15 tablet, Rfl: 0    Current Allergies     Allergies as of 12/09/2024 - Reviewed 12/09/2024   Allergen Reaction Noted    Amoxicillin Rash 11/26/2019            The following portions of the patient's history were reviewed and updated as appropriate: allergies, current medications, past family history, past medical history, past social history, past surgical history and problem list.     Past Medical History:   Diagnosis Date    Anxiety     Depression     Obesity        Past Surgical History:   Procedure Laterality Date    NO PAST SURGERIES         Family History   Problem Relation Age of Onset    No Known Problems Mother     No Known Problems Father          Medications have been verified.        Objective   /76   Pulse 100   Temp 98 °F (36.7 °C)   Resp 16   Ht 5' 1\" (1.549 m)   Wt 122 kg (270 lb)   LMP 11/25/2024 (Exact Date)   SpO2 98%   BMI 51.02 kg/m²        Physical Exam     Physical Exam  Vitals and nursing note reviewed.   Constitutional:       General: She is not in acute distress.     Appearance: Normal appearance. She is normal weight. She is not ill-appearing, toxic-appearing or diaphoretic.   HENT:      Head: Normocephalic and atraumatic.      Right Ear: Tympanic membrane, ear canal and external ear normal. There is no impacted cerumen.      Left Ear: Tympanic membrane, ear canal and external ear normal. There is no impacted cerumen.      Nose: Congestion and rhinorrhea present.      Mouth/Throat:      Mouth: Mucous membranes are moist.      Pharynx: Oropharynx is clear. Posterior oropharyngeal erythema present. No oropharyngeal exudate.   Eyes:      General: No scleral icterus.        Right eye: No discharge.         Left eye: No discharge.      Extraocular Movements: Extraocular movements intact.      Conjunctiva/sclera: Conjunctivae normal.      Pupils: Pupils are equal, round, and reactive to light.   Neck:      Vascular: No carotid bruit.   Cardiovascular:      Rate " and Rhythm: Normal rate and regular rhythm.      Pulses: Normal pulses.      Heart sounds: Normal heart sounds. No murmur heard.     No friction rub. No gallop.   Pulmonary:      Effort: Pulmonary effort is normal. No respiratory distress.      Breath sounds: Normal breath sounds. No stridor. No wheezing, rhonchi or rales.   Chest:      Chest wall: No tenderness.   Musculoskeletal:      Cervical back: Normal range of motion and neck supple. No rigidity or tenderness.   Lymphadenopathy:      Cervical: No cervical adenopathy.   Neurological:      Mental Status: She is alert.

## 2025-06-30 ENCOUNTER — OFFICE VISIT (OUTPATIENT)
Dept: URGENT CARE | Facility: CLINIC | Age: 25
End: 2025-06-30
Payer: COMMERCIAL

## 2025-06-30 VITALS
BODY MASS INDEX: 52.87 KG/M2 | OXYGEN SATURATION: 96 % | DIASTOLIC BLOOD PRESSURE: 84 MMHG | HEIGHT: 61 IN | WEIGHT: 280 LBS | SYSTOLIC BLOOD PRESSURE: 128 MMHG | HEART RATE: 76 BPM | TEMPERATURE: 96.6 F | RESPIRATION RATE: 18 BRPM

## 2025-06-30 DIAGNOSIS — R30.0 DYSURIA: Primary | ICD-10-CM

## 2025-06-30 LAB
SL AMB  POCT GLUCOSE, UA: ABNORMAL
SL AMB LEUKOCYTE ESTERASE,UA: NEGATIVE
SL AMB POCT BILIRUBIN,UA: NEGATIVE
SL AMB POCT BLOOD,UA: ABNORMAL
SL AMB POCT CLARITY,UA: ABNORMAL
SL AMB POCT COLOR,UA: YELLOW
SL AMB POCT KETONES,UA: NEGATIVE
SL AMB POCT NITRITE,UA: NEGATIVE
SL AMB POCT PH,UA: 1.01
SL AMB POCT SPECIFIC GRAVITY,UA: 5
SL AMB POCT URINE HCG: NEGATIVE
SL AMB POCT URINE PROTEIN: NEGATIVE
SL AMB POCT UROBILINOGEN: NEGATIVE

## 2025-06-30 PROCEDURE — 81002 URINALYSIS NONAUTO W/O SCOPE: CPT

## 2025-06-30 PROCEDURE — G0382 LEV 3 HOSP TYPE B ED VISIT: HCPCS

## 2025-06-30 PROCEDURE — S9083 URGENT CARE CENTER GLOBAL: HCPCS

## 2025-06-30 PROCEDURE — 81025 URINE PREGNANCY TEST: CPT

## 2025-06-30 PROCEDURE — 87086 URINE CULTURE/COLONY COUNT: CPT

## 2025-06-30 RX ORDER — PHENAZOPYRIDINE HYDROCHLORIDE 100 MG/1
100 TABLET, FILM COATED ORAL 3 TIMES DAILY PRN
Qty: 10 TABLET | Refills: 0 | Status: SHIPPED | OUTPATIENT
Start: 2025-06-30

## 2025-06-30 NOTE — PATIENT INSTRUCTIONS
Take Phenazopyridine (after meals) as prescribed.   Urine discoloration (orange) may occur with Phenazopyridine  Drink plenty of water   Cranberry supplements  Urinate within 5 minutes following intercourse    Urine culture in process.  Will notify you if the results are positive and there need to be changes to your treatment plan.    Follow up with PCP in 3-5 days.  Proceed to  ER if symptoms worsen.    If tests have been performed at [facility], our office will contact you with results if changes need to be made to the care plan discussed with you at the visit.  You can review your full results on [facility].

## 2025-06-30 NOTE — PROGRESS NOTES
[facility]        NAME: Annmarie LILLY Minor is a 25 y.o. female  : 2000    MRN: [id number]  DATE: 2025  TIME: 1:24 PM    Assessment and Plan   Dysuria [R30.0]  1. Dysuria  POCT urine dip    POCT urine HCG    Urine culture    phenazopyridine (PYRIDIUM) 100 mg tablet        POCT Urine HCG: Negative    POCT Urine Dip:     LEUKOCYTE ESTERASE,UA negative   NITRITE,UA negative   SL AMB POCT UROBILINOGEN negative   POCT URINE PROTEIN negative    PH,UA 1.015   BLOOD,UA moderate   SPECIFIC GRAVITY,UA 5.0   KETONES,UA negative   BILIRUBIN,UA negative   GLUCOSE, UA negativ    COLOR,UA yellow   CLARITY,UA cloudy     Physical examination relatively unremarkable. Urine dip in clinic shows moderate blood but no other signs of acute bacterial UTI. Formal urine culture in process. Should culture result positively for infection, will notify patient & begin her on appropriate ABX at that time. In the interim, plan to treat with pyridium for management of urinary tract pain. Tylenol/ibuprofen for pain/fever. Increased fluids & rest. May continue with other OTC and supportive therapies at home. Encouraged to follow up closely with family physician or healthcare provider. ED precautions provided/discussed. Patient in agreement with plan & verbalized understanding.      Patient Instructions   Take Phenazopyridine (after meals) as prescribed.   Urine discoloration (orange) may occur with Phenazopyridine  Drink plenty of water   Cranberry supplements  Urinate within 5 minutes following intercourse    Urine culture in process.  Will notify you if the results are positive and there need to be changes to your treatment plan.    Follow up with PCP in 3-5 days.  Proceed to  ER if symptoms worsen.    If tests have been performed at [facility], our office will contact you with results if changes need to be made to the care plan discussed with you at the visit.  [facility]    Chief  Complaint     Chief Complaint   Patient presents with    Possible UTI     Burning on urination X 7 days         History of Present Illness       Urinary Tract Infection   This is a new problem. The current episode started in the past 7 days. The problem occurs every urination. The problem has been unchanged. The quality of the pain is described as burning. The pain is at a severity of 4/10. The pain is moderate. There has been no fever. She is Sexually active. Associated symptoms include frequency, hesitancy and urgency. Pertinent negatives include no chills, discharge, flank pain, hematuria, nausea, sweats or vomiting. She has tried NSAIDs for the symptoms. The treatment provided mild relief.       Review of Systems   Review of Systems   Constitutional:  Negative for activity change, appetite change, chills, diaphoresis, fatigue and fever.   Respiratory:  Negative for cough, chest tightness and shortness of breath.    Cardiovascular:  Negative for chest pain and palpitations.   Gastrointestinal:  Negative for abdominal distention, abdominal pain, nausea and vomiting.   Genitourinary:  Positive for dysuria, frequency, hesitancy and urgency. Negative for decreased urine volume, difficulty urinating, flank pain, hematuria, menstrual problem, pelvic pain and vaginal discharge.   Musculoskeletal:  Negative for arthralgias, back pain, gait problem, joint swelling and myalgias.   Skin:  Negative for color change, pallor and rash.   Neurological:  Negative for dizziness, weakness, light-headedness and headaches.         Current Medications     Current Medications[1]    Current Allergies     Allergies as of 06/30/2025 - Reviewed 06/30/2025   Allergen Reaction Noted    Amoxicillin Rash 11/26/2019            The following portions of the patient's history were reviewed and updated as appropriate: allergies, current medications, past family history, past medical history, past social history, past surgical history and problem  "list.     Past Medical History[2]    Past Surgical History[3]    Family History[4]      Medications have been verified.        Objective   /84   Pulse 76   Temp (!) 96.6 °F (35.9 °C)   Resp 18   Ht 5' 1\" (1.549 m)   Wt 127 kg (280 lb)   LMP 06/23/2025 (Approximate)   SpO2 96%   BMI 52.91 kg/m²   Patient's last menstrual period was 06/23/2025 (approximate).       Physical Exam     Physical Exam  Constitutional:       General: She is not in acute distress.     Appearance: Normal appearance. She is not ill-appearing, toxic-appearing or diaphoretic.   HENT:      Head: Normocephalic and atraumatic.      Right Ear: External ear normal.      Left Ear: External ear normal.      Nose: Nose normal.      Mouth/Throat:      Mouth: Mucous membranes are moist.      Pharynx: Oropharynx is clear.     Eyes:      Extraocular Movements: Extraocular movements intact.      Conjunctiva/sclera: Conjunctivae normal.      Pupils: Pupils are equal, round, and reactive to light.       Cardiovascular:      Rate and Rhythm: Normal rate and regular rhythm.      Pulses: Normal pulses.      Heart sounds: Normal heart sounds.   Pulmonary:      Effort: Pulmonary effort is normal. No respiratory distress.      Breath sounds: Normal breath sounds. No stridor. No wheezing, rhonchi or rales.   Chest:      Chest wall: No tenderness.   Abdominal:      General: Abdomen is flat. Bowel sounds are normal. There is no distension.      Palpations: Abdomen is soft. There is no mass.      Tenderness: There is abdominal tenderness in the suprapubic area. There is no right CVA tenderness or left CVA tenderness.      Hernia: No hernia is present.     Musculoskeletal:         General: Normal range of motion.      Cervical back: Normal range of motion and neck supple.     Skin:     General: Skin is warm and dry.      Capillary Refill: Capillary refill takes less than 2 seconds.      Coloration: Skin is not pale.      Findings: No bruising, erythema, " lesion or rash.     Neurological:      General: No focal deficit present.      Mental Status: She is alert and oriented to person, place, and time. Mental status is at baseline.      Motor: No weakness.      Gait: Gait normal.     Psychiatric:         Mood and Affect: Mood normal.         Behavior: Behavior normal.         Thought Content: Thought content normal.         Judgment: Judgment normal.                        [1]   Current Outpatient Medications:     FLUoxetine (PROzac) 10 mg capsule, Take 10 mg by mouth in the morning., Disp: , Rfl:     phenazopyridine (PYRIDIUM) 100 mg tablet, Take 1 tablet (100 mg total) by mouth 3 (three) times a day as needed for bladder spasms, Disp: 10 tablet, Rfl: 0    cyclobenzaprine (FLEXERIL) 10 mg tablet, Take 1 tablet (10 mg total) by mouth 3 (three) times a day as needed for muscle spasms (Patient not taking: Reported on 9/19/2024), Disp: 15 tablet, Rfl: 0  [2]   Past Medical History:  Diagnosis Date    Anxiety     Depression     Obesity    [3]   Past Surgical History:  Procedure Laterality Date    NO PAST SURGERIES     [4]   Family History  Problem Relation Name Age of Onset    No Known Problems Mother      No Known Problems Father

## 2025-07-01 ENCOUNTER — TELEPHONE (OUTPATIENT)
Dept: URGENT CARE | Facility: CLINIC | Age: 25
End: 2025-07-01

## 2025-07-01 LAB — BACTERIA UR CULT: NORMAL

## 2025-07-01 NOTE — TELEPHONE ENCOUNTER
Annmarie called to review urine culture results; Discussed with provider and was instructed to continue pyridium as prescribed, at this time there is not a need for antibiotics. If symptoms continue or worsen she should follow up with primary care provider. All questions were answered at this time.

## 2025-07-30 ENCOUNTER — DOCTOR'S OFFICE (OUTPATIENT)
Dept: URBAN - NONMETROPOLITAN AREA CLINIC 1 | Facility: CLINIC | Age: 25
Setting detail: OPHTHALMOLOGY
End: 2025-07-30
Payer: COMMERCIAL

## 2025-07-30 DIAGNOSIS — H52.223: ICD-10-CM

## 2025-07-30 DIAGNOSIS — H52.13: ICD-10-CM

## 2025-07-30 PROCEDURE — 92014 COMPRE OPH EXAM EST PT 1/>: CPT | Performed by: OPTOMETRIST

## 2025-07-30 ASSESSMENT — REFRACTION_CURRENTRX
OD_VPRISM_DIRECTION: SV
OD_OVR_VA: 20/
OD_CYLINDER: -1.25
OS_VPRISM_DIRECTION: SV
OS_CYLINDER: -0.75
OS_SPHERE: +3.25
OD_SPHERE: -3.25
OS_AXIS: 021
OD_AXIS: 002
OS_OVR_VA: 20/

## 2025-07-30 ASSESSMENT — REFRACTION_AUTOREFRACTION
OS_AXIS: 010
OS_SPHERE: -3.25
OS_CYLINDER: -1.50
OD_CYLINDER: -1.75
OD_SPHERE: -3.00
OD_AXIS: 003

## 2025-07-30 ASSESSMENT — REFRACTION_MANIFEST
OS_AXIS: 015
OD_AXIS: 180
OD_CYLINDER: -1.25
OS_SPHERE: -3.25
OD_VA2: 20/20
OD_VA1: 20/20-2
OS_CYLINDER: -1.00
OS_VA2: 20/20
OS_VA1: 20/20-2
OD_SPHERE: -3.25

## 2025-07-30 ASSESSMENT — KERATOMETRY
OD_K2POWER_DIOPTERS: 46.00
OS_K1POWER_DIOPTERS: 44.00
OS_K2POWER_DIOPTERS: 45.75
OS_AXISANGLE_DEGREES: 009
OD_K1POWER_DIOPTERS: 43.75
OD_AXISANGLE_DEGREES: 007

## 2025-07-30 ASSESSMENT — VISUAL ACUITY
OD_BCVA: 20/25-1
OS_BCVA: 20/25-2

## 2025-07-30 ASSESSMENT — CONFRONTATIONAL VISUAL FIELD TEST (CVF)
OS_FINDINGS: FULL
OD_FINDINGS: FULL

## 2025-07-30 ASSESSMENT — TONOMETRY
OS_IOP_MMHG: 14
OD_IOP_MMHG: 14